# Patient Record
Sex: FEMALE | Race: WHITE | NOT HISPANIC OR LATINO | Employment: FULL TIME | URBAN - METROPOLITAN AREA
[De-identification: names, ages, dates, MRNs, and addresses within clinical notes are randomized per-mention and may not be internally consistent; named-entity substitution may affect disease eponyms.]

---

## 2018-12-18 ENCOUNTER — APPOINTMENT (OUTPATIENT)
Dept: RADIOLOGY | Facility: MEDICAL CENTER | Age: 48
End: 2018-12-18
Attending: NEUROLOGICAL SURGERY
Payer: COMMERCIAL

## 2018-12-18 ENCOUNTER — HOSPITAL ENCOUNTER (OUTPATIENT)
Facility: MEDICAL CENTER | Age: 48
End: 2018-12-19
Attending: NEUROLOGICAL SURGERY | Admitting: NEUROLOGICAL SURGERY
Payer: COMMERCIAL

## 2018-12-18 LAB
HCG UR QL: NEGATIVE
SP GR UR REFRACTOMETRY: 1.01

## 2018-12-18 PROCEDURE — 160035 HCHG PACU - 1ST 60 MINS PHASE I: Performed by: NEUROLOGICAL SURGERY

## 2018-12-18 PROCEDURE — A6402 STERILE GAUZE <= 16 SQ IN: HCPCS | Performed by: NEUROLOGICAL SURGERY

## 2018-12-18 PROCEDURE — A9270 NON-COVERED ITEM OR SERVICE: HCPCS | Performed by: ANESTHESIOLOGY

## 2018-12-18 PROCEDURE — G0378 HOSPITAL OBSERVATION PER HR: HCPCS

## 2018-12-18 PROCEDURE — 72020 X-RAY EXAM OF SPINE 1 VIEW: CPT

## 2018-12-18 PROCEDURE — 700102 HCHG RX REV CODE 250 W/ 637 OVERRIDE(OP): Performed by: ANESTHESIOLOGY

## 2018-12-18 PROCEDURE — 110454 HCHG SHELL REV 250: Performed by: NEUROLOGICAL SURGERY

## 2018-12-18 PROCEDURE — 700101 HCHG RX REV CODE 250

## 2018-12-18 PROCEDURE — 160041 HCHG SURGERY MINUTES - EA ADDL 1 MIN LEVEL 4: Performed by: NEUROLOGICAL SURGERY

## 2018-12-18 PROCEDURE — 700101 HCHG RX REV CODE 250: Performed by: NEUROLOGICAL SURGERY

## 2018-12-18 PROCEDURE — 500885 HCHG PACK, JACKSON TABLE: Performed by: NEUROLOGICAL SURGERY

## 2018-12-18 PROCEDURE — 700111 HCHG RX REV CODE 636 W/ 250 OVERRIDE (IP)

## 2018-12-18 PROCEDURE — 500002 HCHG ADHESIVE, DERMABOND: Performed by: NEUROLOGICAL SURGERY

## 2018-12-18 PROCEDURE — 501838 HCHG SUTURE GENERAL: Performed by: NEUROLOGICAL SURGERY

## 2018-12-18 PROCEDURE — 700111 HCHG RX REV CODE 636 W/ 250 OVERRIDE (IP): Performed by: ANESTHESIOLOGY

## 2018-12-18 PROCEDURE — 160029 HCHG SURGERY MINUTES - 1ST 30 MINS LEVEL 4: Performed by: NEUROLOGICAL SURGERY

## 2018-12-18 PROCEDURE — 160009 HCHG ANES TIME/MIN: Performed by: NEUROLOGICAL SURGERY

## 2018-12-18 PROCEDURE — 160002 HCHG RECOVERY MINUTES (STAT): Performed by: NEUROLOGICAL SURGERY

## 2018-12-18 PROCEDURE — 502240 HCHG MISC OR SUPPLY RC 0272: Performed by: NEUROLOGICAL SURGERY

## 2018-12-18 PROCEDURE — 160036 HCHG PACU - EA ADDL 30 MINS PHASE I: Performed by: NEUROLOGICAL SURGERY

## 2018-12-18 PROCEDURE — 160048 HCHG OR STATISTICAL LEVEL 1-5: Performed by: NEUROLOGICAL SURGERY

## 2018-12-18 PROCEDURE — 81025 URINE PREGNANCY TEST: CPT

## 2018-12-18 RX ORDER — BISACODYL 10 MG
10 SUPPOSITORY, RECTAL RECTAL
Status: DISCONTINUED | OUTPATIENT
Start: 2018-12-18 | End: 2018-12-19 | Stop reason: HOSPADM

## 2018-12-18 RX ORDER — ONDANSETRON 4 MG/1
4 TABLET, ORALLY DISINTEGRATING ORAL EVERY 4 HOURS PRN
Status: DISCONTINUED | OUTPATIENT
Start: 2018-12-18 | End: 2018-12-19 | Stop reason: HOSPADM

## 2018-12-18 RX ORDER — HYDROMORPHONE HYDROCHLORIDE 1 MG/ML
0.1 INJECTION, SOLUTION INTRAMUSCULAR; INTRAVENOUS; SUBCUTANEOUS
Status: DISCONTINUED | OUTPATIENT
Start: 2018-12-18 | End: 2018-12-18 | Stop reason: HOSPADM

## 2018-12-18 RX ORDER — HYDROMORPHONE HYDROCHLORIDE 1 MG/ML
0.4 INJECTION, SOLUTION INTRAMUSCULAR; INTRAVENOUS; SUBCUTANEOUS
Status: DISCONTINUED | OUTPATIENT
Start: 2018-12-18 | End: 2018-12-18 | Stop reason: HOSPADM

## 2018-12-18 RX ORDER — HALOPERIDOL 5 MG/ML
1 INJECTION INTRAMUSCULAR
Status: DISCONTINUED | OUTPATIENT
Start: 2018-12-18 | End: 2018-12-18 | Stop reason: HOSPADM

## 2018-12-18 RX ORDER — FLUCONAZOLE 100 MG/1
150 TABLET ORAL
COMMUNITY

## 2018-12-18 RX ORDER — AMOXICILLIN 250 MG
1 CAPSULE ORAL NIGHTLY
Status: DISCONTINUED | OUTPATIENT
Start: 2018-12-18 | End: 2018-12-19 | Stop reason: HOSPADM

## 2018-12-18 RX ORDER — METOPROLOL SUCCINATE 100 MG/1
150 TABLET, EXTENDED RELEASE ORAL DAILY
COMMUNITY

## 2018-12-18 RX ORDER — BACITRACIN 50000 [IU]/1
INJECTION, POWDER, FOR SOLUTION INTRAMUSCULAR
Status: DISCONTINUED | OUTPATIENT
Start: 2018-12-18 | End: 2018-12-18 | Stop reason: HOSPADM

## 2018-12-18 RX ORDER — VENLAFAXINE 37.5 MG/1
37.5 TABLET ORAL 2 TIMES DAILY WITH MEALS
Status: DISCONTINUED | OUTPATIENT
Start: 2018-12-19 | End: 2018-12-19 | Stop reason: HOSPADM

## 2018-12-18 RX ORDER — ONDANSETRON 2 MG/ML
4 INJECTION INTRAMUSCULAR; INTRAVENOUS EVERY 4 HOURS PRN
Status: DISCONTINUED | OUTPATIENT
Start: 2018-12-18 | End: 2018-12-19 | Stop reason: HOSPADM

## 2018-12-18 RX ORDER — HYDROMORPHONE HYDROCHLORIDE 1 MG/ML
0.2 INJECTION, SOLUTION INTRAMUSCULAR; INTRAVENOUS; SUBCUTANEOUS
Status: DISCONTINUED | OUTPATIENT
Start: 2018-12-18 | End: 2018-12-18 | Stop reason: HOSPADM

## 2018-12-18 RX ORDER — ATORVASTATIN CALCIUM 20 MG/1
40 TABLET, FILM COATED ORAL DAILY
COMMUNITY

## 2018-12-18 RX ORDER — METHYLPREDNISOLONE SODIUM SUCCINATE 125 MG/2ML
INJECTION, POWDER, LYOPHILIZED, FOR SOLUTION INTRAMUSCULAR; INTRAVENOUS
Status: DISCONTINUED | OUTPATIENT
Start: 2018-12-18 | End: 2018-12-18 | Stop reason: HOSPADM

## 2018-12-18 RX ORDER — MIDAZOLAM HYDROCHLORIDE 1 MG/ML
1 INJECTION INTRAMUSCULAR; INTRAVENOUS
Status: DISCONTINUED | OUTPATIENT
Start: 2018-12-18 | End: 2018-12-18 | Stop reason: HOSPADM

## 2018-12-18 RX ORDER — ENEMA 19; 7 G/133ML; G/133ML
1 ENEMA RECTAL
Status: DISCONTINUED | OUTPATIENT
Start: 2018-12-18 | End: 2018-12-19 | Stop reason: HOSPADM

## 2018-12-18 RX ORDER — MORPHINE SULFATE 10 MG/ML
4 INJECTION, SOLUTION INTRAMUSCULAR; INTRAVENOUS
Status: DISCONTINUED | OUTPATIENT
Start: 2018-12-18 | End: 2018-12-19 | Stop reason: HOSPADM

## 2018-12-18 RX ORDER — SODIUM CHLORIDE AND POTASSIUM CHLORIDE 150; 900 MG/100ML; MG/100ML
INJECTION, SOLUTION INTRAVENOUS CONTINUOUS
Status: DISCONTINUED | OUTPATIENT
Start: 2018-12-18 | End: 2018-12-19 | Stop reason: HOSPADM

## 2018-12-18 RX ORDER — OXYCODONE HYDROCHLORIDE 5 MG/1
5 TABLET ORAL
Status: DISCONTINUED | OUTPATIENT
Start: 2018-12-18 | End: 2018-12-19 | Stop reason: HOSPADM

## 2018-12-18 RX ORDER — ONDANSETRON 2 MG/ML
4 INJECTION INTRAMUSCULAR; INTRAVENOUS
Status: DISCONTINUED | OUTPATIENT
Start: 2018-12-18 | End: 2018-12-18 | Stop reason: HOSPADM

## 2018-12-18 RX ORDER — DOCUSATE SODIUM 100 MG/1
100 CAPSULE, LIQUID FILLED ORAL 2 TIMES DAILY
Status: DISCONTINUED | OUTPATIENT
Start: 2018-12-18 | End: 2018-12-19 | Stop reason: HOSPADM

## 2018-12-18 RX ORDER — CYCLOBENZAPRINE HCL 10 MG
10 TABLET ORAL 3 TIMES DAILY PRN
Status: ON HOLD | COMMUNITY
End: 2018-12-18

## 2018-12-18 RX ORDER — DESVENLAFAXINE SUCCINATE 50 MG/1
50 TABLET, EXTENDED RELEASE ORAL DAILY
COMMUNITY

## 2018-12-18 RX ORDER — OXYCODONE HYDROCHLORIDE AND ACETAMINOPHEN 5; 325 MG/1; MG/1
2 TABLET ORAL
Status: COMPLETED | OUTPATIENT
Start: 2018-12-18 | End: 2018-12-18

## 2018-12-18 RX ORDER — HYDRALAZINE HYDROCHLORIDE 20 MG/ML
10 INJECTION INTRAMUSCULAR; INTRAVENOUS
Status: DISCONTINUED | OUTPATIENT
Start: 2018-12-18 | End: 2018-12-19 | Stop reason: HOSPADM

## 2018-12-18 RX ORDER — TIZANIDINE 4 MG/1
2 TABLET ORAL 3 TIMES DAILY PRN
Status: DISCONTINUED | OUTPATIENT
Start: 2018-12-18 | End: 2018-12-19 | Stop reason: HOSPADM

## 2018-12-18 RX ORDER — ATORVASTATIN CALCIUM 20 MG/1
40 TABLET, FILM COATED ORAL DAILY
Status: DISCONTINUED | OUTPATIENT
Start: 2018-12-19 | End: 2018-12-19 | Stop reason: HOSPADM

## 2018-12-18 RX ORDER — AMOXICILLIN 250 MG
1 CAPSULE ORAL
Status: DISCONTINUED | OUTPATIENT
Start: 2018-12-18 | End: 2018-12-19 | Stop reason: HOSPADM

## 2018-12-18 RX ORDER — PROMETHAZINE HYDROCHLORIDE 12.5 MG/1
12.5-25 SUPPOSITORY RECTAL EVERY 4 HOURS PRN
Status: DISCONTINUED | OUTPATIENT
Start: 2018-12-18 | End: 2018-12-19 | Stop reason: HOSPADM

## 2018-12-18 RX ORDER — CALCIUM CARBONATE 500 MG/1
500 TABLET, CHEWABLE ORAL 2 TIMES DAILY
Status: DISCONTINUED | OUTPATIENT
Start: 2018-12-18 | End: 2018-12-19 | Stop reason: HOSPADM

## 2018-12-18 RX ORDER — OXYCODONE HYDROCHLORIDE AND ACETAMINOPHEN 5; 325 MG/1; MG/1
1 TABLET ORAL
Status: COMPLETED | OUTPATIENT
Start: 2018-12-18 | End: 2018-12-18

## 2018-12-18 RX ORDER — SODIUM CHLORIDE, SODIUM LACTATE, POTASSIUM CHLORIDE, CALCIUM CHLORIDE 600; 310; 30; 20 MG/100ML; MG/100ML; MG/100ML; MG/100ML
INJECTION, SOLUTION INTRAVENOUS ONCE
Status: COMPLETED | OUTPATIENT
Start: 2018-12-18 | End: 2018-12-18

## 2018-12-18 RX ORDER — MEPERIDINE HYDROCHLORIDE 25 MG/ML
6.25 INJECTION INTRAMUSCULAR; INTRAVENOUS; SUBCUTANEOUS
Status: DISCONTINUED | OUTPATIENT
Start: 2018-12-18 | End: 2018-12-18 | Stop reason: HOSPADM

## 2018-12-18 RX ORDER — BUPIVACAINE HYDROCHLORIDE AND EPINEPHRINE 5; 5 MG/ML; UG/ML
INJECTION, SOLUTION EPIDURAL; INTRACAUDAL; PERINEURAL
Status: DISCONTINUED | OUTPATIENT
Start: 2018-12-18 | End: 2018-12-18 | Stop reason: HOSPADM

## 2018-12-18 RX ORDER — DESVENLAFAXINE SUCCINATE 50 MG/1
50 TABLET, EXTENDED RELEASE ORAL DAILY
Status: DISCONTINUED | OUTPATIENT
Start: 2018-12-19 | End: 2018-12-18

## 2018-12-18 RX ORDER — OXYCODONE HYDROCHLORIDE 10 MG/1
10 TABLET ORAL
Status: DISCONTINUED | OUTPATIENT
Start: 2018-12-18 | End: 2018-12-19 | Stop reason: HOSPADM

## 2018-12-18 RX ORDER — CHLORAL HYDRATE 500 MG
1000 CAPSULE ORAL
Status: ON HOLD | COMMUNITY
End: 2018-12-18

## 2018-12-18 RX ORDER — PROMETHAZINE HYDROCHLORIDE 25 MG/1
12.5-25 TABLET ORAL EVERY 4 HOURS PRN
Status: DISCONTINUED | OUTPATIENT
Start: 2018-12-18 | End: 2018-12-19 | Stop reason: HOSPADM

## 2018-12-18 RX ORDER — POLYETHYLENE GLYCOL 3350 17 G/17G
1 POWDER, FOR SOLUTION ORAL 2 TIMES DAILY PRN
Status: DISCONTINUED | OUTPATIENT
Start: 2018-12-18 | End: 2018-12-19 | Stop reason: HOSPADM

## 2018-12-18 RX ORDER — DIPHENHYDRAMINE HYDROCHLORIDE 50 MG/ML
12.5 INJECTION INTRAMUSCULAR; INTRAVENOUS
Status: DISCONTINUED | OUTPATIENT
Start: 2018-12-18 | End: 2018-12-18 | Stop reason: HOSPADM

## 2018-12-18 RX ORDER — METOPROLOL SUCCINATE 50 MG/1
150 TABLET, EXTENDED RELEASE ORAL DAILY
Status: DISCONTINUED | OUTPATIENT
Start: 2018-12-19 | End: 2018-12-19 | Stop reason: HOSPADM

## 2018-12-18 RX ADMIN — FENTANYL CITRATE 50 MCG: 50 INJECTION, SOLUTION INTRAMUSCULAR; INTRAVENOUS at 18:15

## 2018-12-18 RX ADMIN — HALOPERIDOL LACTATE 1 MG: 5 INJECTION, SOLUTION INTRAMUSCULAR at 21:55

## 2018-12-18 RX ADMIN — OXYCODONE AND ACETAMINOPHEN 2 TABLET: 5; 325 TABLET ORAL at 18:00

## 2018-12-18 RX ADMIN — HYDROMORPHONE HYDROCHLORIDE 0.2 MG: 1 INJECTION, SOLUTION INTRAMUSCULAR; INTRAVENOUS; SUBCUTANEOUS at 20:43

## 2018-12-18 RX ADMIN — HYDROMORPHONE HYDROCHLORIDE 0.4 MG: 1 INJECTION, SOLUTION INTRAMUSCULAR; INTRAVENOUS; SUBCUTANEOUS at 20:48

## 2018-12-18 RX ADMIN — HYDROMORPHONE HYDROCHLORIDE 0.4 MG: 1 INJECTION, SOLUTION INTRAMUSCULAR; INTRAVENOUS; SUBCUTANEOUS at 20:53

## 2018-12-18 RX ADMIN — SODIUM CHLORIDE, SODIUM LACTATE, POTASSIUM CHLORIDE, CALCIUM CHLORIDE: 600; 310; 30; 20 INJECTION, SOLUTION INTRAVENOUS at 14:31

## 2018-12-18 RX ADMIN — HYDROMORPHONE HYDROCHLORIDE 0.4 MG: 1 INJECTION, SOLUTION INTRAMUSCULAR; INTRAVENOUS; SUBCUTANEOUS at 18:55

## 2018-12-18 RX ADMIN — POTASSIUM CHLORIDE AND SODIUM CHLORIDE: 900; 150 INJECTION, SOLUTION INTRAVENOUS at 23:11

## 2018-12-18 RX ADMIN — HYDROMORPHONE HYDROCHLORIDE 0.2 MG: 1 INJECTION, SOLUTION INTRAMUSCULAR; INTRAVENOUS; SUBCUTANEOUS at 21:43

## 2018-12-18 RX ADMIN — HYDROMORPHONE HYDROCHLORIDE 0.4 MG: 1 INJECTION, SOLUTION INTRAMUSCULAR; INTRAVENOUS; SUBCUTANEOUS at 20:31

## 2018-12-18 RX ADMIN — FENTANYL CITRATE 50 MCG: 50 INJECTION, SOLUTION INTRAMUSCULAR; INTRAVENOUS at 18:35

## 2018-12-18 ASSESSMENT — PAIN SCALES - GENERAL
PAINLEVEL_OUTOF10: 6
PAINLEVEL_OUTOF10: 7
PAINLEVEL_OUTOF10: 5
PAINLEVEL_OUTOF10: 7
PAINLEVEL_OUTOF10: 0
PAINLEVEL_OUTOF10: ASSUMED PAIN PRESENT
PAINLEVEL_OUTOF10: 5
PAINLEVEL_OUTOF10: 7
PAINLEVEL_OUTOF10: 6
PAINLEVEL_OUTOF10: 5
PAINLEVEL_OUTOF10: 6

## 2018-12-18 ASSESSMENT — PATIENT HEALTH QUESTIONNAIRE - PHQ9
1. LITTLE INTEREST OR PLEASURE IN DOING THINGS: NOT AT ALL
2. FEELING DOWN, DEPRESSED, IRRITABLE, OR HOPELESS: NOT AT ALL
SUM OF ALL RESPONSES TO PHQ9 QUESTIONS 1 AND 2: 0

## 2018-12-18 ASSESSMENT — LIFESTYLE VARIABLES
EVER_SMOKED: NEVER
ALCOHOL_USE: NO

## 2018-12-19 VITALS
DIASTOLIC BLOOD PRESSURE: 52 MMHG | OXYGEN SATURATION: 91 % | BODY MASS INDEX: 30.93 KG/M2 | RESPIRATION RATE: 20 BRPM | SYSTOLIC BLOOD PRESSURE: 108 MMHG | HEIGHT: 70 IN | WEIGHT: 216.05 LBS | HEART RATE: 98 BPM | TEMPERATURE: 98.1 F

## 2018-12-19 PROCEDURE — 302135 SEQUENTIAL COMPRESSION MACHINE: Performed by: NEUROLOGICAL SURGERY

## 2018-12-19 PROCEDURE — 700102 HCHG RX REV CODE 250 W/ 637 OVERRIDE(OP): Performed by: NEUROLOGICAL SURGERY

## 2018-12-19 PROCEDURE — 96374 THER/PROPH/DIAG INJ IV PUSH: CPT

## 2018-12-19 PROCEDURE — 700111 HCHG RX REV CODE 636 W/ 250 OVERRIDE (IP): Performed by: NEUROLOGICAL SURGERY

## 2018-12-19 PROCEDURE — A9270 NON-COVERED ITEM OR SERVICE: HCPCS | Performed by: NEUROLOGICAL SURGERY

## 2018-12-19 PROCEDURE — G0378 HOSPITAL OBSERVATION PER HR: HCPCS

## 2018-12-19 PROCEDURE — 700112 HCHG RX REV CODE 229: Performed by: NEUROLOGICAL SURGERY

## 2018-12-19 RX ADMIN — ONDANSETRON 4 MG: 2 INJECTION INTRAMUSCULAR; INTRAVENOUS at 05:27

## 2018-12-19 RX ADMIN — ANTACID TABLETS 500 MG: 500 TABLET, CHEWABLE ORAL at 11:11

## 2018-12-19 RX ADMIN — ATORVASTATIN CALCIUM 40 MG: 20 TABLET, FILM COATED ORAL at 05:27

## 2018-12-19 RX ADMIN — VENLAFAXINE 37.5 MG: 37.5 TABLET ORAL at 11:12

## 2018-12-19 RX ADMIN — OXYCODONE HYDROCHLORIDE 10 MG: 10 TABLET ORAL at 11:09

## 2018-12-19 RX ADMIN — OXYCODONE HYDROCHLORIDE 10 MG: 10 TABLET ORAL at 05:27

## 2018-12-19 RX ADMIN — METOPROLOL SUCCINATE 150 MG: 50 TABLET, EXTENDED RELEASE ORAL at 11:11

## 2018-12-19 RX ADMIN — DOCUSATE SODIUM 100 MG: 100 CAPSULE, LIQUID FILLED ORAL at 05:27

## 2018-12-19 ASSESSMENT — PATIENT HEALTH QUESTIONNAIRE - PHQ9
2. FEELING DOWN, DEPRESSED, IRRITABLE, OR HOPELESS: NOT AT ALL
SUM OF ALL RESPONSES TO PHQ9 QUESTIONS 1 AND 2: 0
1. LITTLE INTEREST OR PLEASURE IN DOING THINGS: NOT AT ALL

## 2018-12-19 ASSESSMENT — PAIN SCALES - GENERAL
PAINLEVEL_OUTOF10: 5
PAINLEVEL_OUTOF10: 2

## 2018-12-19 NOTE — PROGRESS NOTES
Assisted pt to bathroom,ambulated with 1 person asist, voided qs, tolerating po well, medicated for pain per MAR, encouraged to do deep breathing,call button within reach,will continue to monitor.

## 2018-12-19 NOTE — DISCHARGE PLANNING
Care Transition Team Assessment    Spoke with patient at bedside. Anticipate no needs @ D/C. Spouse will be ride @ D/C.    Information Source  Orientation : Oriented x 4  Information Given By: Patient    Readmission Evaluation  Is this a readmission?: No    Interdisciplinary Discharge Planning  Does Admitting Nurse Feel This Could be a Complex Discharge?: No  Primary Care Physician: Yanna  Lives with - Patient's Self Care Capacity: Spouse  Patient or legal guardian wants to designate a caregiver (see row info): Yes  Caregiver name: anjel jensen  Caregiver relationship to patient:   Caregiver contact info: [phone]  Support Systems: Spouse / Significant Other  Do You Take your Prescribed Medications Regularly: Yes  Able to Return to Previous ADL's: Yes  Mobility Issues: Yes  Prior Services: None  Patient Expects to be Discharged to:: Home  Assistance Needed: No  Durable Medical Equipment: Walker    Discharge Preparedness  What are your discharge supports?: Spouse  Prior Functional Level: Uses Walker  Difficulity with ADLs: None    Functional Assesment  Prior Functional Level: Uses Walker    Finances  Prescription Coverage: Yes    Anticipated Discharge Information  Anticipated discharge disposition: Home  Discharge Address: [address] [place] [address]  Discharge Contact Phone Number: [phone]

## 2018-12-19 NOTE — OR NURSING
"Seen and checked by Dr. Laguerre.Neuro checks WNL.C/O \"6\"pain level-PRN med's.given,ice pack applied over back incision.   "

## 2018-12-19 NOTE — PROGRESS NOTES
Pt educated on use of IS, able to pull 1500 x5, tried to wean off oxygen, now on 1L  Of oxygen, will continue to monitor.

## 2018-12-19 NOTE — PROGRESS NOTES
Neurosurgery Progress Note    Subjective:  Much better, LBP- fair control, no leg s/s, amb to BR, voiding, hungry, on O2- I watched her sats for approx 10 min on room air and she remained 93%    Exam:  BLE 5/5, inc c/d flat w/ dermabond    BP  Min: 95/51  Max: 109/56  Pulse  Av.6  Min: 71  Max: 96  Resp  Avg: 15.4  Min: 12  Max: 21  Temp  Av.3 °C (97.4 °F)  Min: 36 °C (96.8 °F)  Max: 36.8 °C (98.2 °F)  SpO2  Av.6 %  Min: 89 %  Max: 100 %    No Data Recorded                      Intake/Output       18 0700 - 18 0659 18 07 - 18 0659      6682-5588 1722-6686 Total 3464-9816 2664-7205 Total       Intake    P.O.  --  100 100  --  -- --    P.O. -- 100 100 -- -- --    I.V.  1000  1000 2000  --  -- --    Crystalloid Intake 1000 1000 2000 -- -- --    Total Intake 1000 1100 2100 -- -- --       Output    Total Output -- -- -- -- -- --       Net I/O     1000 1100 2100 -- -- --            Intake/Output Summary (Last 24 hours) at 18 0856  Last data filed at 18 2311   Gross per 24 hour   Intake             2100 ml   Output                0 ml   Net             2100 ml            • atorvastatin  40 mg DAILY   • metoprolol SR  150 mg DAILY   • Pharmacy Consult Request  1 Each PRN   • MD ALERT...DO NOT ADMINISTER NSAIDS or ASPIRIN unless ORDERED By Neurosurgery  1 Each PRN   • docusate sodium  100 mg BID   • senna-docusate  1 Tab Nightly   • senna-docusate  1 Tab Q24HRS PRN   • polyethylene glycol/lytes  1 Packet BID PRN   • magnesium hydroxide  30 mL QDAY PRN   • bisacodyl  10 mg Q24HRS PRN   • fleet  1 Each Once PRN   • 0.9 % NaCl with KCl 20 mEq 1,000 mL   Continuous   • oxyCODONE immediate-release  5 mg Q3HRS PRN   • oxyCODONE immediate release  10 mg Q3HRS PRN   • morphine injection  4 mg Q3HRS PRN   • ondansetron  4 mg Q4HRS PRN   • ondansetron  4 mg Q4HRS PRN   • promethazine  12.5-25 mg Q4HRS PRN   • promethazine  12.5-25 mg Q4HRS PRN   • prochlorperazine  5-10 mg Q4HRS PRN    • tizanidine  2 mg TID PRN   • hydrALAZINE  10 mg Q HOUR PRN   • benzocaine-menthol  1 Lozenge Q2HRS PRN   • calcium carbonate  500 mg BID   • venlafaxine  37.5 mg BID WITH MEALS       Assessment and Plan:  Hospital day #1  POD #1 left L4-5 microdisc  Prophylactic anticoagulation: no         Start date/time: n/a    NM intact  Pain controlled  Sats ok on RA  Discharge   picked up rx yest

## 2018-12-19 NOTE — PROGRESS NOTES
Report received,pt admit from PACU, via gurney, drowsy but arouses to verbal stimuli, back inc with derma bond abdulaziz, no drainage noted, transferred to bed safely, call button within reach,will continue to monitor.

## 2018-12-19 NOTE — DISCHARGE INSTRUCTIONS
ACTIVITY: Rest and take it easy for the first 24 hours.  A responsible adult is recommended to remain with you during that time.  It is normal to feel sleepy.  We encourage you to not do anything that requires balance, judgment or coordination.    MILD FLU-LIKE SYMPTOMS ARE NORMAL. YOU MAY EXPERIENCE GENERALIZED MUSCLE ACHES, THROAT IRRITATION, HEADACHE AND/OR SOME NAUSEA.    FOR 24 HOURS DO NOT:  Drive, operate machinery or run household appliances.  Drink beer or alcoholic beverages.   Make important decisions or sign legal documents.    SPECIAL INSTRUCTIONS:   Follow up with APROXANNE at [facility] in 2 weeks [phone]  Follow up with Dr. Laguerre in 6 weeks  No pushing, pulling, lifting greater than 10 pounds  No repetitive bending, no twisting  Ok to shower, pat incision dry - 24 hours after surgery. NO bath tubs, hot tubs. NO ointments or creams on incision.   No non-steroidal anti-inflammatory medications or aspirin until cleared by Dr. Laguerre  Ambulate as much is comfortable  No driving for at least 2 weeks following surgery or until cleared  Obtain over the counter senekot take 1-2 tablets daily while taking narcotic pain medication  Do not return to work until cleared by physician      DIET: To avoid nausea, slowly advance diet as tolerated, avoiding spicy or greasy foods for the first day.  Add more substantial food to your diet according to your physician's instructions.  Babies can be fed formula or breast milk as soon as they are hungry.  INCREASE FLUIDS AND FIBER TO AVOID CONSTIPATION.    SURGICAL DRESSING/BATHING: Ok to shower, pat incision dry - 24 hours after surgery. NO bath tubs, hot tubs. NO ointments or creams on incision.     FOLLOW-UP APPOINTMENT:  A follow-up appointment should be arranged with your doctor in Neurosurgery in 2 weeks, Dr Laguerre 6 weeks; call to schedule.    You should CALL YOUR PHYSICIAN if you develop:  Fever greater than 101 degrees F.  Pain not relieved by  medication, or persistent nausea or vomiting.  Excessive bleeding (blood soaking through dressing) or unexpected drainage from the wound.  Extreme redness or swelling around the incision site, drainage of pus or foul smelling drainage.  Inability to urinate or empty your bladder within 8 hours.  Problems with breathing or chest pain.    You should call 911 if you develop problems with breathing or chest pain.  If you are unable to contact your doctor or surgical center, you should go to the nearest emergency room or urgent care center.  Physician's telephone #: Dr. Laguerre/[facility]: [phone]    If any questions arise, call your doctor.  If your doctor is not available, please feel free to call the Surgical Center at [phone].  The Center is open Monday through Friday from 7AM to 7PM.  You can also call the [employer] HOTLINE open 24 hours/day, 7 days/week and speak to a nurse at [phone], or toll free at [phone].    A registered nurse may call you a few days after your surgery to see how you are doing after your procedure.    MEDICATIONS: Resume taking daily medication.  Take prescribed pain medication with food.  If no medication is prescribed, you may take non-aspirin pain medication if needed.  PAIN MEDICATION CAN BE VERY CONSTIPATING.  Take a stool softener or laxative such as senokot, pericolace, or milk of magnesia if needed.    No prescriptions on discharge. Prescriptions for pain med, muscle relaxers and nausea given prior to surgery.  Last pain medication given at ***.    If your physician has prescribed pain medication that includes Acetaminophen (Tylenol), do not take additional Acetaminophen (Tylenol) while taking the prescribed medication.    Depression / Suicide Risk    As you are discharged from this [facility] facility, it is important to learn how to keep safe from harming yourself.    Recognize the warning signs:  · Abrupt changes in personality, positive or negative-  including increase in energy   · Giving away possessions  · Change in eating patterns- significant weight changes-  positive or negative  · Change in sleeping patterns- unable to sleep or sleeping all the time   · Unwillingness or inability to communicate  · Depression  · Unusual sadness, discouragement and loneliness  · Talk of wanting to die  · Neglect of personal appearance   · Rebelliousness- reckless behavior  · Withdrawal from people/activities they love  · Confusion- inability to concentrate     If you or a loved one observes any of these behaviors or has concerns about self-harm, here's what you can do:  · Talk about it- your feelings and reasons for harming yourself  · Remove any means that you might use to hurt yourself (examples: pills, rope, extension cords, firearm)  · Get professional help from the community (Mental Health, Substance Abuse, psychological counseling)  · Do not be alone:Call your Safe Contact- someone whom you trust who will be there for you.  · Call your local CRISIS HOTLINE 597-2541 or 451-422-1166  · Call your local Children's Mobile Crisis Response Team [place] (721) 281-2326 or www.[employer]  · Call the toll free National Suicide Prevention Hotlines   · National Suicide Prevention Lifeline 211-412-TPBB (8139)  · National Hope Line Network 800-SUICIDE (108-3627)

## 2018-12-19 NOTE — OR SURGEON
Immediate Post OP Note    PreOp Diagnosis: Left L4-5 herniated disc     PostOp Diagnosis: Left L4-5 herniated disc     Procedure(s):  LUMBAR LAMINECTOMY DISKECTOMY- LEFT L4-5 MICRODISC - Wound Class: Clean with Drain    Surgeon(s):  Gonzalo Laguerre M.D.    Anesthesiologist/Type of Anesthesia:  Anesthesiologist: Selam Singh M.D./General    Surgical Staff:  Assistant: ALBERTA Gastelum  Circulator: Antoni Ortega R.N.  Relief Circulator: Tavia Regalado  Scrub Person: Lizbeth Huff  Radiology Technologist: Arleen Zuniga    Specimens removed if any:  * No specimens in log *    Estimated Blood Loss: min     Findings: good decompression     Complications: none         12/18/2018 5:14 PM ALBERTA Gastelum

## 2018-12-19 NOTE — OR NURSING
Assumed care of patient at 1930. Pt sleeping and having a really difficult time waking up. When awake, pt c/o moderate to severe pain. Pt's  at bedside and expresses concerns regarding getting pt into a hotel room tonight with her pain level and drowsiness.   Neuro-surg paged and notified of above. Admission orders received. LISA Matos states neuro surg unit is first preference however if beds are not available, ortho or CDU are ok.   Will continue to medicate for pain per anesthesia record.

## 2018-12-19 NOTE — OP REPORT
DATE OF SERVICE:  12/18/2018    PREOPERATIVE DIAGNOSES:  Left-sided lumbar 4-5 disk herniation with motor and   sensory radiculopathy, recalcitrant to nonoperative measures.    POSTOPERATIVE DIAGNOSES:  Left-sided lumbar 4-5 disk herniation with motor and   sensory radiculopathy, recalcitrant to nonoperative measures.    PROCEDURES PERFORMED:  1.  Left-sided lumbar 4-5 microdiskectomy.  2.  Use of operative microscope for microdissection.    SURGEON:  Gonzalo Laguerre MD    ASSISTANT:  LISA Johansen    ANESTHESIA:  General.    COMPLICATIONS:  None.    ESTIMATED BLOOD LOSS:  Minimal.    DESCRIPTION OF PROCEDURE:  The patient was brought to the operating room and   identified in the usual fashion.  General endotracheal anesthesia was induced   by the anesthesia team.  The patient was then placed prone on the Sandeep   table with bolsters.  All pressure points were meticulously padded.  Midline   lumbar incision was marked in the skin using fluoroscopic guidance.  She was   then prepped and draped in the usual sterile fashion.  Local anesthesia was   infiltrated in the subcutaneous tissue.  A 10 blade was used to incise the   skin.  Dissection was carried down in a subperiosteal fashion on the left side   only using Bovie electrocautery.  Retractors were put in place.  Upgoing   curette was placed underneath the lamina of L4 at the level L4-L5 disk space.    Film was taken confirming that we were at the correct level.  This was then   marked with a marking pen.  We then adjusted the retractors and brought in the   operative microscope.  We performed a standard microdiskectomy of lumbar 4-5   using a high speed air drill and Kerrison 2 and 3 punches to remove bone,   upgoing curette to release ligamentum flavum, nerve root retractor to retract   the thecal sac medially.  Bipolar electrocautery was used for hemostasis.  A   15 blade was used to incise the disk space.  We then removed disk contents   using a  combination of alley and a pituitary, downbiting curette and a Kirby   dental to help milk disk contents free.  Once we had excellent decompression   of the central canal, lateral recesses and neural foramina at L4-L5 on the   left side, we then removed additional disk contents with an alley and a   pituitary to confirm that there were no additional unstable fragments.    Antibiotic irrigation was injected into the disk space to free up any of these   additional fragments.  They were then removed with an alley and a pituitary.    The nerve root retractor was removed.  Hemostasis on the epidural veins was   achieved with bipolar and FloSeal with gentle tamponade.  We then swept with a   double ball probe underneath the L5 nerve root axilla and nerve root shoulder   and the L4 nerve root axilla to confirm we had excellent decompression.  Once   we had meticulous hemostasis with FloSeal with gentle tamponade, we then   placed epidural Solu-Medrol and fentanyl.  All retractors were removed.    Hemostasis was meticulous.  We then closed the incision in layers and topped   with Dermabond.  All sponge and needle counts were correct x2 at the end the   case.  I was present and scrubbed for the entire procedure.  The patient   awakened and was transferred to the recovery room in stable condition.       ____________________________________     MARC SMITH MD    CPD / NTS    DD:  12/19/2018 11:00:53  DT:  12/19/2018 12:08:28    D#:  [id number]  Job#:  [id number]

## 2018-12-19 NOTE — DISCHARGE SUMMARY
DATE OF ADMISSION:  12/18/2018    DATE OF DISCHARGE:  12/19/2018    ADMITTING DIAGNOSIS:  Left L4-L5 herniated disk.    HISTORY OF PRESENT ILLNESS:  Please refer to the previously dictated history   and physical for complete details.  The patient is a 48-year-old patient of   Dr. Laguerre who had the above findings.  Dr. Laguerre discussed surgery and the   risks and benefits and the patient wished to proceed.    COURSE OF HOSPITALIZATION:  The patient was admitted to [facility] on 12/18/2018   and on that date, she was brought to the operating room where she underwent a   left L4-L5 microdiskectomy with Dr. Laguerre.    Postoperatively, she was slow to wake up and her surgery was late in the day,   so we admitted her for outpatient observation overnight.  On postoperative day   #1, she has low back pain.  She denies leg symptoms.  Her strength is 5/5.    She is ambulatory, voiding, and eating.  Her incision is clean, dry, and flat   with Dermabond in place.  She was on oxygen overnight.  We took her off and   monitored her for greater than 10 minutes on room air and her saturations   remained 93%.    DISCHARGE INSTRUCTIONS:  Provided to the patient on discharge.    DISCHARGE MEDICATIONS:  1.  Narcotics written yesterday and filled by her .    IMPRESSION AND PLAN:  1.  Admitting Diagnosis:  Left L4-L5 herniated disk.  2.  Operation performed:  L4-L5 microdiskectomy on 12/18/2018.  3.  The patient is discharged in stable medical condition with the   instructions and medications as outlined above.       ____________________________________     LISA DASILVA / JERAD    DD:  12/19/2018 09:11:28  DT:  12/19/2018 09:46:40    D#:  [id number]  Job#:  [id number]

## (undated) DEVICE — SPONGE GAUZESTER. 2X2 4-PL - (2/PK 50PK/BX 30BX/CS)

## (undated) DEVICE — FORCEP BIPOLAR ISOCOOL 8.5 1.0MM TIP"

## (undated) DEVICE — SYRINGE SAFETY 5 ML 18 GA X 1-1/2 BLUNT LL (100/BX 4BX/CA)

## (undated) DEVICE — GLOVE BIOGEL SZ 8.5 SURGICAL PF LTX - (50PR/BX 4BX/CA)

## (undated) DEVICE — MASK ANESTHESIA ADULT  - (100/CA)

## (undated) DEVICE — ELECTRODE DUAL RETURN W/ CORD - (50/PK)

## (undated) DEVICE — KIT SURGIFLO W/OUT THROMBIN - (6EA/CA)

## (undated) DEVICE — GOWN WARMING STANDARD FLEX - (30/CA)

## (undated) DEVICE — ARMREST CRADLE FOAM - (2PR/PK 12PR/CA)

## (undated) DEVICE — LACTATED RINGERS INJ 1000 ML - (14EA/CA 60CA/PF)

## (undated) DEVICE — PACK JACKSON TABLE KIT W/OUT - HR (6EA/CA)

## (undated) DEVICE — CORDS BIPOLAR COAGULATION - 12FT STERILE DISP. (10EA/BX)

## (undated) DEVICE — GOWN SURGICAL XX-LARGE - (28EA/CA) SIRUS NON REINFORCED

## (undated) DEVICE — PROTECTOR ULNA NERVE - (36PR/CA)

## (undated) DEVICE — SYRINGE SAFETY 10 ML 18 GA X 1 1/2 BLUNT LL (100/BX 4BX/CA)

## (undated) DEVICE — SUTURE GENERAL

## (undated) DEVICE — DERMABOND ADVANCED - (12EA/BX)

## (undated) DEVICE — SYRINGE ASEPTO - (50EA/CA

## (undated) DEVICE — KIT ANESTHESIA W/CIRCUIT & 3/LT BAG W/FILTER (20EA/CA)

## (undated) DEVICE — SET LEADWIRE 5 LEAD BEDSIDE DISPOSABLE ECG (1SET OF 5/EA)

## (undated) DEVICE — GLOVE BIOGEL PI INDICATOR SZ 6.5 SURGICAL PF LF - (50/BX 4BX/CA)

## (undated) DEVICE — PEN SKIN MARKER W/RULER - (50EA/BX)

## (undated) DEVICE — CANISTER SUCTION 3000ML MECHANICAL FILTER AUTO SHUTOFF MEDI-VAC NONSTERILE LF DISP  (40EA/CA)

## (undated) DEVICE — PACK NEURO - (2EA/CA)

## (undated) DEVICE — NEPTUNE 4 PORT MANIFOLD - (20/PK)

## (undated) DEVICE — SUTURE 4-0 MONOCRYL PLUS PS-2 - 27 INCH (36/BX)

## (undated) DEVICE — CHLORAPREP 26 ML APPLICATOR - ORANGE TINT(25/CA)

## (undated) DEVICE — DRESSING,POST OP BORDER 4INX6IN AG

## (undated) DEVICE — ELECTRODE 850 FOAM ADHESIVE - HYDROGEL RADIOTRNSPRNT (50/PK)

## (undated) DEVICE — SENSOR SPO2 NEO LNCS ADHESIVE (20/BX) SEE USER NOTES

## (undated) DEVICE — TUBING CLEARLINK DUO-VENT - C-FLO (48EA/CA)

## (undated) DEVICE — GLOVE BIOGEL INDICATOR SZ 7SURGICAL PF LTX - (50/BX 4BX/CA)

## (undated) DEVICE — DRESSING TRANSPARENT FILM TEGADERM 4 X 4.75" (50EA/BX)"

## (undated) DEVICE — HEADREST PRONEVIEW LARGE - (10/CA)

## (undated) DEVICE — SLEEVE, VASO, THIGH, MED

## (undated) DEVICE — MIDAS LUBRICATOR DIFFUSER PACK (4EA/CA)

## (undated) DEVICE — BOVIE BLADE COATED &INSULATED - 25/PK

## (undated) DEVICE — SUTURE 0 VICRYL PLUS CT-2 - 8 X 18 INCH (12/BX)

## (undated) DEVICE — LACTATED RINGERS INJ. 500 ML - (24EA/CA)

## (undated) DEVICE — SET EXTENSION WITH 2 PORTS (48EA/CA) ***PART #2C8610 IS A SUBSTITUTE*****

## (undated) DEVICE — DRAPE CLEAR W/ELASTIC BAND RAD CARM 40 X40" (20EA/CA)"

## (undated) DEVICE — KIT ROOM DECONTAMINATION

## (undated) DEVICE — SUCTION INSTRUMENT YANKAUER BULBOUS TIP W/O VENT (50EA/CA)

## (undated) DEVICE — GLOVE BIOGEL SZ 6.5 SURGICAL PF LTX (50PR/BX 4BX/CA)

## (undated) DEVICE — TOOL DISSECT MATCH HEAD

## (undated) DEVICE — TUBE E-T HI-LO CUFF 7.0MM (10EA/PK)

## (undated) DEVICE — SODIUM CHL IRRIGATION 0.9% 1000ML (12EA/CA)

## (undated) DEVICE — DRAPE STRLE REG TOWEL 18X24 - (10/BX 4BX/CA)"